# Patient Record
Sex: FEMALE | Employment: OTHER | URBAN - METROPOLITAN AREA
[De-identification: names, ages, dates, MRNs, and addresses within clinical notes are randomized per-mention and may not be internally consistent; named-entity substitution may affect disease eponyms.]

---

## 2022-12-29 ENCOUNTER — HOSPITAL ENCOUNTER (OUTPATIENT)
Dept: ULTRASOUND IMAGING | Age: 67
Discharge: HOME | End: 2022-12-31
Payer: MEDICARE

## 2022-12-29 ENCOUNTER — TELEPHONE (OUTPATIENT)
Dept: INTERNAL MEDICINE | Age: 67
End: 2022-12-29

## 2022-12-29 DIAGNOSIS — M79.604 RIGHT LEG PAIN: ICD-10-CM

## 2022-12-29 DIAGNOSIS — M79.604 RIGHT LEG PAIN: Primary | ICD-10-CM

## 2022-12-29 PROCEDURE — 93971 EXTREMITY STUDY: CPT

## 2022-12-29 NOTE — TELEPHONE ENCOUNTER
US is scheduled for today 12/29/22 at [facility] @ 4:30 pm.  Patient is to arrive at 4:00 pm and bring photo id and insurance cards. Patient notified and verbalized understanding.

## 2022-12-29 NOTE — TELEPHONE ENCOUNTER
Attempted to reach patient to get updated information.   Left message stating to return call to office

## 2022-12-29 NOTE — TELEPHONE ENCOUNTER
Patient in from out of town. Was on a flight from [place] to [facility] and then back. In addition, came on 12/28/2022 from [place] to [place]. Now with pain in R Lower extremity behind the knee. No erythema or edema at this time. No previous history of blood clots. Will need to order [address] [employer] to rule out DVT. Patient phone number [phone].     Gema Cuello MD  12/29/2022

## 2022-12-30 ENCOUNTER — HOSPITAL ENCOUNTER (OUTPATIENT)
Dept: GENERAL RADIOLOGY | Age: 67
End: 2022-12-30
Payer: MEDICARE

## 2022-12-30 ENCOUNTER — HOSPITAL ENCOUNTER (OUTPATIENT)
Age: 67
End: 2022-12-30
Payer: MEDICARE

## 2022-12-30 DIAGNOSIS — M79.604 RIGHT LEG PAIN: ICD-10-CM

## 2022-12-30 PROCEDURE — 73590 X-RAY EXAM OF LOWER LEG: CPT
